# Patient Record
Sex: FEMALE | Race: WHITE | NOT HISPANIC OR LATINO | Employment: OTHER | URBAN - METROPOLITAN AREA
[De-identification: names, ages, dates, MRNs, and addresses within clinical notes are randomized per-mention and may not be internally consistent; named-entity substitution may affect disease eponyms.]

---

## 2018-11-05 ENCOUNTER — HOSPITAL ENCOUNTER (OUTPATIENT)
Facility: HOSPITAL | Age: 68
Discharge: HOME | DRG: 872 | End: 2018-11-07
Attending: EMERGENCY MEDICINE | Admitting: HOSPITALIST
Payer: MEDICARE

## 2018-11-05 DIAGNOSIS — N39.0 SEPSIS DUE TO URINARY TRACT INFECTION: Primary | ICD-10-CM

## 2018-11-05 DIAGNOSIS — A41.9 SEPSIS DUE TO URINARY TRACT INFECTION: Primary | ICD-10-CM

## 2018-11-05 DIAGNOSIS — R50.9 ACUTE FEBRILE ILLNESS: ICD-10-CM

## 2018-11-05 PROCEDURE — 83735 ASSAY OF MAGNESIUM: CPT

## 2018-11-05 PROCEDURE — G0378 HOSPITAL OBSERVATION PER HR: HCPCS

## 2018-11-05 PROCEDURE — 99285 EMERGENCY DEPT VISIT HI MDM: CPT | Mod: 25

## 2018-11-05 PROCEDURE — 82962 GLUCOSE BLOOD TEST: CPT

## 2018-11-05 PROCEDURE — 85025 COMPLETE CBC W/AUTO DIFF WBC: CPT

## 2018-11-05 PROCEDURE — 96374 THER/PROPH/DIAG INJ IV PUSH: CPT

## 2018-11-05 PROCEDURE — 83605 ASSAY OF LACTIC ACID: CPT

## 2018-11-05 PROCEDURE — 87040 BLOOD CULTURE FOR BACTERIA: CPT

## 2018-11-05 PROCEDURE — 84100 ASSAY OF PHOSPHORUS: CPT

## 2018-11-05 PROCEDURE — 81000 URINALYSIS NONAUTO W/SCOPE: CPT

## 2018-11-05 PROCEDURE — 11000001 HC ACUTE MED/SURG PRIVATE ROOM

## 2018-11-05 PROCEDURE — 87502 INFLUENZA DNA AMP PROBE: CPT

## 2018-11-05 PROCEDURE — 80053 COMPREHEN METABOLIC PANEL: CPT

## 2018-11-06 PROBLEM — I10 ESSENTIAL HYPERTENSION: Status: ACTIVE | Noted: 2018-11-06

## 2018-11-06 PROBLEM — N17.9 AKI (ACUTE KIDNEY INJURY): Status: ACTIVE | Noted: 2018-11-06

## 2018-11-06 PROBLEM — A41.9 SEPSIS DUE TO URINARY TRACT INFECTION: Status: ACTIVE | Noted: 2018-11-06

## 2018-11-06 PROBLEM — R65.20 SEVERE SEPSIS: Status: ACTIVE | Noted: 2018-11-06

## 2018-11-06 PROBLEM — N39.0 SEPSIS DUE TO URINARY TRACT INFECTION: Status: ACTIVE | Noted: 2018-11-06

## 2018-11-06 LAB
ALBUMIN SERPL BCP-MCNC: 2.7 G/DL
ALBUMIN SERPL BCP-MCNC: 3.6 G/DL
ALP SERPL-CCNC: 69 U/L
ALT SERPL W/O P-5'-P-CCNC: 20 U/L
ANION GAP SERPL CALC-SCNC: 12 MMOL/L
ANION GAP SERPL CALC-SCNC: 6 MMOL/L
AST SERPL-CCNC: 25 U/L
BACTERIA #/AREA URNS HPF: ABNORMAL /HPF
BASOPHILS # BLD AUTO: 0.01 K/UL
BASOPHILS # BLD AUTO: 0.02 K/UL
BASOPHILS NFR BLD: 0.2 %
BASOPHILS NFR BLD: 0.2 %
BILIRUB SERPL-MCNC: 0.4 MG/DL
BILIRUB UR QL STRIP: NEGATIVE
BUN SERPL-MCNC: 23 MG/DL
BUN SERPL-MCNC: 26 MG/DL
CALCIUM SERPL-MCNC: 10 MG/DL
CALCIUM SERPL-MCNC: 8.2 MG/DL
CHLORIDE SERPL-SCNC: 100 MMOL/L
CHLORIDE SERPL-SCNC: 106 MMOL/L
CLARITY UR: CLEAR
CO2 SERPL-SCNC: 23 MMOL/L
CO2 SERPL-SCNC: 24 MMOL/L
COLOR UR: YELLOW
CREAT SERPL-MCNC: 1.2 MG/DL
CREAT SERPL-MCNC: 1.6 MG/DL
DIFFERENTIAL METHOD: ABNORMAL
DIFFERENTIAL METHOD: NORMAL
EOSINOPHIL # BLD AUTO: 0 K/UL
EOSINOPHIL # BLD AUTO: 0.1 K/UL
EOSINOPHIL NFR BLD: 0.3 %
EOSINOPHIL NFR BLD: 0.6 %
ERYTHROCYTE [DISTWIDTH] IN BLOOD BY AUTOMATED COUNT: 13.4 %
ERYTHROCYTE [DISTWIDTH] IN BLOOD BY AUTOMATED COUNT: 13.4 %
EST. GFR  (AFRICAN AMERICAN): 38 ML/MIN/1.73 M^2
EST. GFR  (AFRICAN AMERICAN): 54 ML/MIN/1.73 M^2
EST. GFR  (NON AFRICAN AMERICAN): 33 ML/MIN/1.73 M^2
EST. GFR  (NON AFRICAN AMERICAN): 47 ML/MIN/1.73 M^2
ESTIMATED AVG GLUCOSE: 237 MG/DL
GLUCOSE SERPL-MCNC: 294 MG/DL
GLUCOSE SERPL-MCNC: 350 MG/DL
GLUCOSE UR QL STRIP: ABNORMAL
HBA1C MFR BLD HPLC: 9.9 %
HCT VFR BLD AUTO: 38.6 %
HCT VFR BLD AUTO: 45 %
HGB BLD-MCNC: 12.7 G/DL
HGB BLD-MCNC: 14.9 G/DL
HGB UR QL STRIP: ABNORMAL
HYALINE CASTS #/AREA URNS LPF: 1 /LPF
INFLUENZA A, MOLECULAR: NEGATIVE
INFLUENZA B, MOLECULAR: NEGATIVE
KETONES UR QL STRIP: NEGATIVE
LACTATE SERPL-SCNC: 1.3 MMOL/L
LACTATE SERPL-SCNC: 3 MMOL/L
LEUKOCYTE ESTERASE UR QL STRIP: ABNORMAL
LYMPHOCYTES # BLD AUTO: 1.7 K/UL
LYMPHOCYTES # BLD AUTO: 1.8 K/UL
LYMPHOCYTES NFR BLD: 19.7 %
LYMPHOCYTES NFR BLD: 27.4 %
MAGNESIUM SERPL-MCNC: 1.4 MG/DL
MAGNESIUM SERPL-MCNC: 1.8 MG/DL
MCH RBC QN AUTO: 29.5 PG
MCH RBC QN AUTO: 29.5 PG
MCHC RBC AUTO-ENTMCNC: 32.9 G/DL
MCHC RBC AUTO-ENTMCNC: 33.1 G/DL
MCV RBC AUTO: 89 FL
MCV RBC AUTO: 90 FL
MICROSCOPIC COMMENT: ABNORMAL
MONOCYTES # BLD AUTO: 0.4 K/UL
MONOCYTES # BLD AUTO: 1.1 K/UL
MONOCYTES NFR BLD: 13 %
MONOCYTES NFR BLD: 6.8 %
NEUTROPHILS # BLD AUTO: 4.2 K/UL
NEUTROPHILS # BLD AUTO: 5.9 K/UL
NEUTROPHILS NFR BLD: 65.3 %
NEUTROPHILS NFR BLD: 66.5 %
NITRITE UR QL STRIP: NEGATIVE
PH UR STRIP: 6 [PH] (ref 5–8)
PHOSPHATE SERPL-MCNC: 2.2 MG/DL
PHOSPHATE SERPL-MCNC: 2.2 MG/DL
PHOSPHATE SERPL-MCNC: 2.6 MG/DL
PLATELET # BLD AUTO: 165 K/UL
PLATELET # BLD AUTO: 207 K/UL
PMV BLD AUTO: 11 FL
PMV BLD AUTO: 11.9 FL
POCT GLUCOSE: 202 MG/DL (ref 70–110)
POCT GLUCOSE: 228 MG/DL (ref 70–110)
POCT GLUCOSE: 281 MG/DL (ref 70–110)
POCT GLUCOSE: 316 MG/DL (ref 70–110)
POCT GLUCOSE: 328 MG/DL (ref 70–110)
POTASSIUM SERPL-SCNC: 4 MMOL/L
POTASSIUM SERPL-SCNC: 4.2 MMOL/L
PROT SERPL-MCNC: 8.3 G/DL
PROT UR QL STRIP: ABNORMAL
RBC # BLD AUTO: 4.3 M/UL
RBC # BLD AUTO: 5.05 M/UL
RBC #/AREA URNS HPF: 1 /HPF (ref 0–4)
SODIUM SERPL-SCNC: 135 MMOL/L
SODIUM SERPL-SCNC: 136 MMOL/L
SP GR UR STRIP: 1.02 (ref 1–1.03)
SPECIMEN SOURCE: NORMAL
SQUAMOUS #/AREA URNS HPF: 2 /HPF
URN SPEC COLLECT METH UR: ABNORMAL
UROBILINOGEN UR STRIP-ACNC: NEGATIVE EU/DL
WBC # BLD AUTO: 6.46 K/UL
WBC # BLD AUTO: 8.79 K/UL
WBC #/AREA URNS HPF: 10 /HPF (ref 0–5)
YEAST URNS QL MICRO: ABNORMAL

## 2018-11-06 PROCEDURE — 87040 BLOOD CULTURE FOR BACTERIA: CPT

## 2018-11-06 PROCEDURE — 96372 THER/PROPH/DIAG INJ SC/IM: CPT

## 2018-11-06 PROCEDURE — 63600175 PHARM REV CODE 636 W HCPCS: Performed by: EMERGENCY MEDICINE

## 2018-11-06 PROCEDURE — 36415 COLL VENOUS BLD VENIPUNCTURE: CPT

## 2018-11-06 PROCEDURE — G0378 HOSPITAL OBSERVATION PER HR: HCPCS

## 2018-11-06 PROCEDURE — 25000003 PHARM REV CODE 250: Performed by: HOSPITALIST

## 2018-11-06 PROCEDURE — 94799 UNLISTED PULMONARY SVC/PX: CPT

## 2018-11-06 PROCEDURE — 80069 RENAL FUNCTION PANEL: CPT

## 2018-11-06 PROCEDURE — 25000003 PHARM REV CODE 250: Performed by: EMERGENCY MEDICINE

## 2018-11-06 PROCEDURE — 83036 HEMOGLOBIN GLYCOSYLATED A1C: CPT

## 2018-11-06 PROCEDURE — 83735 ASSAY OF MAGNESIUM: CPT

## 2018-11-06 PROCEDURE — 83605 ASSAY OF LACTIC ACID: CPT

## 2018-11-06 PROCEDURE — 63600175 PHARM REV CODE 636 W HCPCS: Performed by: HOSPITALIST

## 2018-11-06 PROCEDURE — 11000001 HC ACUTE MED/SURG PRIVATE ROOM

## 2018-11-06 PROCEDURE — S5571 INSULIN DISPOS PEN 3 ML: HCPCS | Performed by: EMERGENCY MEDICINE

## 2018-11-06 PROCEDURE — 85025 COMPLETE CBC W/AUTO DIFF WBC: CPT

## 2018-11-06 RX ORDER — IBUPROFEN 200 MG
16 TABLET ORAL
Status: DISCONTINUED | OUTPATIENT
Start: 2018-11-06 | End: 2018-11-07 | Stop reason: HOSPADM

## 2018-11-06 RX ORDER — ACETAMINOPHEN 325 MG/1
650 TABLET ORAL EVERY 6 HOURS PRN
Status: DISCONTINUED | OUTPATIENT
Start: 2018-11-06 | End: 2018-11-06

## 2018-11-06 RX ORDER — VALSARTAN AND HYDROCHLOROTHIAZIDE 320; 25 MG/1; MG/1
TABLET, FILM COATED ORAL
COMMUNITY
Start: 2018-10-27

## 2018-11-06 RX ORDER — GABAPENTIN 300 MG/1
300 CAPSULE ORAL 3 TIMES DAILY
COMMUNITY

## 2018-11-06 RX ORDER — GLUCAGON 1 MG
1 KIT INJECTION
Status: DISCONTINUED | OUTPATIENT
Start: 2018-11-06 | End: 2018-11-07 | Stop reason: HOSPADM

## 2018-11-06 RX ORDER — HYDRALAZINE HYDROCHLORIDE 20 MG/ML
10 INJECTION INTRAMUSCULAR; INTRAVENOUS EVERY 8 HOURS PRN
Status: DISCONTINUED | OUTPATIENT
Start: 2018-11-06 | End: 2018-11-07 | Stop reason: HOSPADM

## 2018-11-06 RX ORDER — LANOLIN ALCOHOL/MO/W.PET/CERES
400 CREAM (GRAM) TOPICAL 2 TIMES DAILY
Status: DISCONTINUED | OUTPATIENT
Start: 2018-11-06 | End: 2018-11-07 | Stop reason: HOSPADM

## 2018-11-06 RX ORDER — INSULIN ASPART 100 [IU]/ML
1-10 INJECTION, SOLUTION INTRAVENOUS; SUBCUTANEOUS
Status: DISCONTINUED | OUTPATIENT
Start: 2018-11-06 | End: 2018-11-07 | Stop reason: HOSPADM

## 2018-11-06 RX ORDER — HUMAN INSULIN 100 [IU]/ML
INJECTION, SOLUTION SUBCUTANEOUS
COMMUNITY
Start: 2018-08-24

## 2018-11-06 RX ORDER — IBUPROFEN 200 MG
24 TABLET ORAL
Status: DISCONTINUED | OUTPATIENT
Start: 2018-11-06 | End: 2018-11-07 | Stop reason: HOSPADM

## 2018-11-06 RX ORDER — VALSARTAN AND HYDROCHLOROTHIAZIDE 320; 25 MG/1; MG/1
1 TABLET, FILM COATED ORAL DAILY
Status: DISCONTINUED | OUTPATIENT
Start: 2018-11-06 | End: 2018-11-06

## 2018-11-06 RX ORDER — SODIUM CHLORIDE 0.9 % (FLUSH) 0.9 %
5 SYRINGE (ML) INJECTION
Status: DISCONTINUED | OUTPATIENT
Start: 2018-11-06 | End: 2018-11-07 | Stop reason: HOSPADM

## 2018-11-06 RX ORDER — GABAPENTIN 300 MG/1
300 CAPSULE ORAL 3 TIMES DAILY
Status: DISCONTINUED | OUTPATIENT
Start: 2018-11-06 | End: 2018-11-07 | Stop reason: HOSPADM

## 2018-11-06 RX ORDER — HEPARIN SODIUM 5000 [USP'U]/ML
5000 INJECTION, SOLUTION INTRAVENOUS; SUBCUTANEOUS EVERY 8 HOURS
Status: DISCONTINUED | OUTPATIENT
Start: 2018-11-06 | End: 2018-11-07 | Stop reason: HOSPADM

## 2018-11-06 RX ORDER — HYDROCHLOROTHIAZIDE 25 MG/1
25 TABLET ORAL DAILY
Status: DISCONTINUED | OUTPATIENT
Start: 2018-11-06 | End: 2018-11-06

## 2018-11-06 RX ORDER — SODIUM CHLORIDE 450 MG/100ML
INJECTION, SOLUTION INTRAVENOUS CONTINUOUS
Status: ACTIVE | OUTPATIENT
Start: 2018-11-06 | End: 2018-11-06

## 2018-11-06 RX ORDER — VALSARTAN 80 MG/1
320 TABLET ORAL DAILY
Status: DISCONTINUED | OUTPATIENT
Start: 2018-11-06 | End: 2018-11-07 | Stop reason: HOSPADM

## 2018-11-06 RX ORDER — SODIUM,POTASSIUM PHOSPHATES 280-250MG
2 POWDER IN PACKET (EA) ORAL
Status: COMPLETED | OUTPATIENT
Start: 2018-11-06 | End: 2018-11-07

## 2018-11-06 RX ORDER — ACETAMINOPHEN 10 MG/ML
1000 INJECTION, SOLUTION INTRAVENOUS EVERY 8 HOURS
Status: COMPLETED | OUTPATIENT
Start: 2018-11-06 | End: 2018-11-07

## 2018-11-06 RX ADMIN — INSULIN ASPART 3 UNITS: 100 INJECTION, SOLUTION INTRAVENOUS; SUBCUTANEOUS at 09:11

## 2018-11-06 RX ADMIN — INSULIN ASPART 4 UNITS: 100 INJECTION, SOLUTION INTRAVENOUS; SUBCUTANEOUS at 04:11

## 2018-11-06 RX ADMIN — GABAPENTIN 300 MG: 300 CAPSULE ORAL at 08:11

## 2018-11-06 RX ADMIN — CEFTRIAXONE 1 G: 1 INJECTION, SOLUTION INTRAVENOUS at 05:11

## 2018-11-06 RX ADMIN — POTASSIUM & SODIUM PHOSPHATES POWDER PACK 280-160-250 MG 2 PACKET: 280-160-250 PACK at 08:11

## 2018-11-06 RX ADMIN — HYDRALAZINE HYDROCHLORIDE 10 MG: 20 INJECTION INTRAMUSCULAR; INTRAVENOUS at 01:11

## 2018-11-06 RX ADMIN — ACETAMINOPHEN 650 MG: 325 TABLET ORAL at 05:11

## 2018-11-06 RX ADMIN — CEFTRIAXONE 2 G: 2 INJECTION, SOLUTION INTRAVENOUS at 01:11

## 2018-11-06 RX ADMIN — MAGNESIUM OXIDE TAB 400 MG (241.3 MG ELEMENTAL MG) 400 MG: 400 (241.3 MG) TAB at 05:11

## 2018-11-06 RX ADMIN — INSULIN ASPART 8 UNITS: 100 INJECTION, SOLUTION INTRAVENOUS; SUBCUTANEOUS at 11:11

## 2018-11-06 RX ADMIN — INSULIN DETEMIR 10 UNITS: 100 INJECTION, SOLUTION SUBCUTANEOUS at 08:11

## 2018-11-06 RX ADMIN — SODIUM CHLORIDE 2934 ML: 0.9 INJECTION, SOLUTION INTRAVENOUS at 12:11

## 2018-11-06 RX ADMIN — HEPARIN SODIUM 5000 UNITS: 5000 INJECTION, SOLUTION INTRAVENOUS; SUBCUTANEOUS at 06:11

## 2018-11-06 RX ADMIN — HEPARIN SODIUM 5000 UNITS: 5000 INJECTION, SOLUTION INTRAVENOUS; SUBCUTANEOUS at 09:11

## 2018-11-06 RX ADMIN — FOLIC ACID: 5 INJECTION, SOLUTION INTRAMUSCULAR; INTRAVENOUS; SUBCUTANEOUS at 05:11

## 2018-11-06 RX ADMIN — GABAPENTIN 300 MG: 300 CAPSULE ORAL at 02:11

## 2018-11-06 RX ADMIN — HYDRALAZINE HYDROCHLORIDE 10 MG: 20 INJECTION INTRAMUSCULAR; INTRAVENOUS at 05:11

## 2018-11-06 RX ADMIN — INSULIN ASPART 4 UNITS: 100 INJECTION, SOLUTION INTRAVENOUS; SUBCUTANEOUS at 06:11

## 2018-11-06 RX ADMIN — POTASSIUM & SODIUM PHOSPHATES POWDER PACK 280-160-250 MG 2 PACKET: 280-160-250 PACK at 05:11

## 2018-11-06 RX ADMIN — HEPARIN SODIUM 5000 UNITS: 5000 INJECTION, SOLUTION INTRAVENOUS; SUBCUTANEOUS at 01:11

## 2018-11-06 RX ADMIN — ACETAMINOPHEN 650 MG: 325 TABLET ORAL at 02:11

## 2018-11-06 RX ADMIN — ACETAMINOPHEN 1000 MG: 10 INJECTION, SOLUTION INTRAVENOUS at 05:11

## 2018-11-06 RX ADMIN — SODIUM CHLORIDE: 0.45 INJECTION, SOLUTION INTRAVENOUS at 03:11

## 2018-11-06 NOTE — HPI
68F h/o IDDM and HTN presents with fever x 3 days. Associated sxs include chills, generalized myalgia, increased urinary frequency, dysuria, and cloudy urine. Symptoms are constant and moderate in severity. Exacerbated by nothing and relieved by nothing. Patient denies any fatigue, diaphoresis, CP, SOB, flank pain,hematuria, abd pain, N/V/D, HA, weakness, and all other sxs at this time. Pt denies relief from ibuprofen. No further complaints or concerns at this time.  In ER, Tmax 101.5F, tachycardic and tachypneic, UA show many bacteria, 10 wbc, and trace leukocyte esterase. Lactate 3.0. Patient was given 30ml/kg of NS and pancultured.  Rocephin was started in ER.  Hospital medicine called for admission.

## 2018-11-06 NOTE — H&P
[facility] Hospital Medicine  History & Physical    Patient Name: Radha Martinez  MRN: [id number]  Admission Date: 11/5/2018  Attending Physician: Kofi Douglass MD  Primary Care Provider: Primary Doctor No         Patient information was obtained from patient and ER records.     Subjective:     Principal Problem:Sepsis due to urinary tract infection    Chief Complaint:   Chief Complaint   Patient presents with    Fever     fever, body aches, frequent urination, dysuria, cloudy urine        HPI: 68F h/o IDDM and HTN presents with fever x 3 days. Associated sxs include chills, generalized myalgia, increased urinary frequency, dysuria, and cloudy urine. Symptoms are constant and moderate in severity. Exacerbated by nothing and relieved by nothing. Patient denies any fatigue, diaphoresis, CP, SOB, flank pain,hematuria, abd pain, N/V/D, HA, weakness, and all other sxs at this time. Pt denies relief from ibuprofen. No further complaints or concerns at this time.  In ER, Tmax 101.5F, tachycardic and tachypneic, UA show many bacteria, 10 wbc, and trace leukocyte esterase. Lactate 3.0. Patient was given 30ml/kg of NS and pancultured.  Rocephin was started in ER.  Hospital medicine called for admission.        Past Medical History:   Diagnosis Date    Diabetes mellitus     Hypertension        History reviewed. No pertinent surgical history.    Review of patient's allergies indicates:  No Known Allergies    No current facility-administered medications on file prior to encounter.      Current Outpatient Medications on File Prior to Encounter   Medication Sig    gabapentin (NEURONTIN) 300 MG capsule Take 300 mg by mouth 3 (three) times daily.    insulin detemir (LEVEMIR FLEXPEN SUBQ) Inject into the skin.    liraglutide 0.6 mg/0.1 mL, 18 mg/3 mL, subq PNIJ (VICTOZA 2-SINDY) 0.6 mg/0.1 mL (18 mg/3 mL) PnIj Inject 0.6 mg into the skin once daily.    minocycline (MINOCIN,DYNACIN) 50 MG Cap Take 100 mg by mouth  every 12 (twelve) hours.    NOVOLIN R REGULAR U-100 INSULN 100 unit/mL injection     valsartan-hydrochlorothiazide (DIOVAN-HCT) 320-25 mg per tablet     [DISCONTINUED] insulin NPH-insulin regular, 70/30, (NOVOLIN 70/30) 100 unit/mL (70-30) injection Inject into the skin 2 (two) times daily.    [DISCONTINUED] clindamycin (CLEOCIN) 75 MG capsule Take 75 mg by mouth 4 (four) times daily.    [DISCONTINUED] hydrocodone-acetaminophen  (LORTAB)  mg per tablet Take 1 tablet by mouth every 6 (six) hours as needed.    [DISCONTINUED] oxycodone-acetaminophen 5-325 mg (PERCOCET) 5-325 mg per tablet Take 1 tablet by mouth every 4 (four) hours as needed for Pain.     Family History     Reviewed and not Pertinent           Tobacco Use    Smoking status: Never Smoker   Substance and Sexual Activity    Alcohol use: No    Drug use: Not on file    Sexual activity: Not on file     Review of Systems   Constitutional: Positive for chills, fatigue and fever. Negative for diaphoresis and unexpected weight change.   HENT: Negative for congestion, facial swelling, sore throat and trouble swallowing.    Eyes: Negative for photophobia, redness and visual disturbance.   Respiratory: Negative for apnea, cough, chest tightness, shortness of breath and wheezing.    Cardiovascular: Negative for chest pain, palpitations and leg swelling.   Gastrointestinal: Negative for abdominal distention, abdominal pain, blood in stool, constipation, diarrhea, nausea and vomiting.   Endocrine: Negative for polydipsia, polyphagia and polyuria.   Genitourinary: Positive for urgency. Negative for difficulty urinating, dysuria, flank pain, frequency and hematuria.   Musculoskeletal: Positive for myalgias. Negative for arthralgias, back pain, joint swelling and neck stiffness.   Skin: Negative for pallor and rash.   Allergic/Immunologic: Negative for immunocompromised state.   Neurological: Negative for dizziness, tremors, syncope, weakness,  light-headedness and headaches.   Psychiatric/Behavioral: Negative for agitation, confusion and suicidal ideas.   All other systems reviewed and are negative.    Objective:     Vital Signs (Most Recent):  Temp: (!) 101.5 °F (38.6 °C) (11/05/18 2305)  Pulse: (!) 112 (11/06/18 0031)  Resp: (!) 21 (11/06/18 0031)  BP: (!) 175/81 (11/06/18 0031)  SpO2: (!) 94 % (11/06/18 0031) Vital Signs (24h Range):  Temp:  [101.5 °F (38.6 °C)] 101.5 °F (38.6 °C)  Pulse:  [112-128] 112  Resp:  [21-26] 21  SpO2:  [94 %] 94 %  BP: (175-209)/() 175/81     Weight: 97.8 kg (215 lb 9.6 oz)  Body mass index is 35.88 kg/m².    Physical Exam   Constitutional: She is oriented to person, place, and time. She appears well-developed and well-nourished. No distress.   HENT:   Head: Normocephalic and atraumatic.   Mouth/Throat: Oropharynx is clear and moist.   Eyes: Conjunctivae and EOM are normal. Pupils are equal, round, and reactive to light. No scleral icterus.   Neck: Normal range of motion. Neck supple. No JVD present. No thyromegaly present.   Cardiovascular: Regular rhythm and intact distal pulses. Exam reveals no gallop and no friction rub.   No murmur heard.  tachycardia   Pulmonary/Chest: Effort normal and breath sounds normal. No respiratory distress. She has no wheezes. She has no rales. She exhibits no tenderness.   Abdominal: Soft. Bowel sounds are normal. She exhibits no distension and no mass. There is no tenderness. There is no guarding.   Musculoskeletal: Normal range of motion. She exhibits no tenderness.   Neurological: She is alert and oriented to person, place, and time. No cranial nerve deficit.   Skin: Skin is warm and dry. Capillary refill takes 2 to 3 seconds. No rash noted. She is not diaphoretic. No erythema.   Psychiatric: She has a normal mood and affect.   Nursing note and vitals reviewed.        CRANIAL NERVES     CN III, IV, VI   Pupils are equal, round, and reactive to light.  Extraocular motions are normal.         Significant Labs:   CBC:   Recent Labs   Lab 11/05/18  2352   WBC 8.79   HGB 14.9   HCT 45.0        CMP:   Recent Labs   Lab 11/05/18  2352   *   K 4.2      CO2 23   *   BUN 26*   CREATININE 1.6*   CALCIUM 10.0   PROT 8.3   ALBUMIN 3.6   BILITOT 0.4   ALKPHOS 69   AST 25   ALT 20   ANIONGAP 12   EGFRNONAA 33*     Lactic Acid:   Recent Labs   Lab 11/05/18  2352   LACTATE 3.0*     Urine Studies:   Recent Labs   Lab 11/05/18  2354   COLORU Yellow   APPEARANCEUA Clear   PHUR 6.0   SPECGRAV 1.025   PROTEINUA 1+*   GLUCUA 3+*   KETONESU Negative   BILIRUBINUA Negative   OCCULTUA Trace*   NITRITE Negative   UROBILINOGEN Negative   LEUKOCYTESUR Trace*   RBCUA 1   WBCUA 10*   BACTERIA Many*   SQUAMEPITHEL 2   HYALINECASTS 1     All pertinent labs within the past 24 hours have been reviewed.    Significant Imaging: I have reviewed all pertinent imaging results/findings within the past 24 hours.   Imaging Results          X-Ray Chest AP Portable (In process)                   Assessment/Plan:     * Sepsis due to urinary tract infection    - Severe sepsis due to UTI  - Tachycardic, tachypneic, tmax 101.5F  - initial lactate 3.0  - was given 30ml/kg fluid bolus    - continue 1/2 NS at 125cc/hr  - continue rocephin IV  - follow up cx  - trend lactate until <2.0         Essential hypertension    - hold home meds  - hydralazine IV prn SBP>180       Insulin dependent diabetes mellitus    - Hold home meds  - start diabetic diet, poc glucose qac/qhs, ssi prn         SOULEYMANE (acute kidney injury)    - prerenal due to hyperglycemia, sepsis, IVVD  - hold ACEi  - continue IVF  - monitor I/O         VTE Risk Mitigation (From admission, onward)        Ordered     heparin (porcine) injection 5,000 Units  Every 8 hours      11/06/18 0142     IP VTE HIGH RISK PATIENT  Once      11/06/18 0142             Kofi Douglass MD  Department of Hospital Medicine   [facility]

## 2018-11-06 NOTE — ED PROVIDER NOTES
SCRIBE #1 NOTE: I, Sha Rod, am scribing for, and in the presence of, Saul Siddiqui MD. I have scribed the entire note.      History     Chief Complaint   Patient presents with    Fever     fever, body aches, frequent urination, dysuria, cloudy urine     Review of patient's allergies indicates:  No Known Allergies      History of Present Illness     HPI    11/5/2018, 11:11 PM  History obtained from the patient      History of Present Illness: Radha Martinez is a 68 y.o. female patient with a PMHx including DM and HTN who presents to the Emergency Department for evaluation of a fever (101.5) which onset gradually a few days ago. Symptoms are constant and moderate in severity. Exacerbated by nothing and relieved by nothing. Associated sxs include chills, generalized myalgia, urinary frequency, dysuria, and cloudy urine. Patient denies any fatigue, diaphoresis, CP, SOB, flank pain,hematuria, abd pain, N/V/D, HA, weakness, and all other sxs at this time. Pt denies relief from ibuprofen. No further complaints or concerns at this time.       Arrival mode: Personal vehicle    PCP: Primary Doctor No        Past Medical History:  Past Medical History:   Diagnosis Date    Diabetes mellitus     Hypertension        Past Surgical History:  Past surgical history reviewed not relevant      Family History:  Family history reviewed not relevant      Social History:  Social History     Tobacco Use    Smoking status: Never Smoker   Substance and Sexual Activity    Alcohol use: No    Drug use: Unknown    Sexual activity: Unknown        Review of Systems     Review of Systems   Constitutional: Positive for chills and fever. Negative for activity change, diaphoresis and fatigue.   HENT: Negative for congestion, sore throat and trouble swallowing.    Eyes: Negative for photophobia and visual disturbance.   Respiratory: Negative for cough and shortness of breath.    Cardiovascular: Negative for chest pain.   Gastrointestinal:  Negative for abdominal pain, nausea and vomiting.   Genitourinary: Positive for dysuria and frequency. Negative for decreased urine volume, difficulty urinating, flank pain, hematuria, pelvic pain, urgency, vaginal bleeding, vaginal discharge and vaginal pain.        (+) Cloudy urine   Musculoskeletal: Positive for myalgias (Generalized). Negative for arthralgias, back pain, gait problem, joint swelling, neck pain and neck stiffness.   Skin: Negative for rash.   Neurological: Negative for dizziness, syncope, weakness, light-headedness, numbness and headaches.   Psychiatric/Behavioral: Negative for confusion.   All other systems reviewed and are negative.     Physical Exam     Initial Vitals [11/05/18 2305]   BP Pulse Resp Temp SpO2   (!) 209/101 (!) 128 (!) 26 (!) 101.5 °F (38.6 °C) (!) 94 %      MAP       --          Physical Exam  Nursing Notes and Vital Signs Reviewed.  Constitutional: Patient is in no acute distress. Well-developed and well-nourished.  Head: Atraumatic. Normocephalic.  Eyes: PERRL. EOM intact. Conjunctivae are not pale. No scleral icterus.  ENT: Mucous membranes are moist. Oropharynx is clear and symmetric.    Neck: Supple. Full ROM. No lymphadenopathy.  Cardiovascular: Regular rate. Regular rhythm. No murmurs, rubs, or gallops. Distal pulses are 2+ and symmetric.  Pulmonary/Chest: No respiratory distress. Clear to auscultation bilaterally. No wheezing or rales.  Abdominal: Soft and non-distended.  There is no tenderness.  No rebound, guarding, or rigidity. Good bowel sounds.  Genitourinary: No CVA tenderness  Musculoskeletal: Moves all extremities. No obvious deformities. No edema. No calf tenderness.  Skin: Warm and dry.  Neurological:  Alert, awake, and appropriate.  Normal speech.  No acute focal neurological deficits are appreciated.  Psychiatric: Normal affect. Good eye contact. Appropriate in content.     ED Course   Procedures  ED Vital Signs:  Vitals:    11/05/18 2305 11/05/18 2325  "11/05/18 2348 11/06/18 0000   BP: (!) 209/101      Pulse: (!) 128 (!) 116  (!) 119   Resp: (!) 26      Temp: (!) 101.5 °F (38.6 °C)      TempSrc: Oral      SpO2: (!) 94%      Weight:   97.8 kg (215 lb 9.6 oz)    Height: 5' 5" (1.651 m)       11/06/18 0001 11/06/18 0031   BP: (!) 187/87 (!) 175/81   Pulse: (!) 120 (!) 112   Resp: (!) 25 (!) 21   Temp:     TempSrc:     SpO2: (!) 94% (!) 94%   Weight:     Height:         Abnormal Lab Results:  Labs Reviewed   CBC W/ AUTO DIFFERENTIAL - Abnormal; Notable for the following components:       Result Value    Mono # 1.1 (*)     All other components within normal limits   COMPREHENSIVE METABOLIC PANEL - Abnormal; Notable for the following components:    Sodium 135 (*)     Glucose 350 (*)     BUN, Bld 26 (*)     Creatinine 1.6 (*)     eGFR if  38 (*)     eGFR if non  33 (*)     All other components within normal limits   LACTIC ACID, PLASMA - Abnormal; Notable for the following components:    Lactate (Lactic Acid) 3.0 (*)     All other components within normal limits   URINALYSIS, REFLEX TO URINE CULTURE - Abnormal; Notable for the following components:    Protein, UA 1+ (*)     Glucose, UA 3+ (*)     Occult Blood UA Trace (*)     Leukocytes, UA Trace (*)     All other components within normal limits    Narrative:     Preferred Collection Type->Urine, Clean Catch   URINALYSIS MICROSCOPIC - Abnormal; Notable for the following components:    WBC, UA 10 (*)     Bacteria, UA Many (*)     All other components within normal limits    Narrative:     Preferred Collection Type->Urine, Clean Catch   PHOSPHORUS - Abnormal; Notable for the following components:    Phosphorus 2.6 (*)     All other components within normal limits   INFLUENZA A & B BY MOLECULAR    Narrative:     Preferred Collection Type->Urine, Clean Catch   CULTURE, BLOOD   CULTURE, BLOOD   MAGNESIUM   PHOSPHORUS   MAGNESIUM   URINALYSIS, REFLEX TO URINE CULTURE   LACTIC ACID, PLASMA "   HEMOGLOBIN A1C   CBC W/ AUTO DIFFERENTIAL   RENAL FUNCTION PANEL   PHOSPHORUS   MAGNESIUM   POCT GLUCOSE MONITORING CONTINUOUS        All Lab Results:  Results for orders placed or performed during the hospital encounter of 11/05/18   Influenza A & B by Molecular   Result Value Ref Range    Influenza A, Molecular Negative Negative    Influenza B, Molecular Negative Negative    Flu A & B Source Nasal swab    CBC auto differential   Result Value Ref Range    WBC 8.79 3.90 - 12.70 K/uL    RBC 5.05 4.00 - 5.40 M/uL    Hemoglobin 14.9 12.0 - 16.0 g/dL    Hematocrit 45.0 37.0 - 48.5 %    MCV 89 82 - 98 fL    MCH 29.5 27.0 - 31.0 pg    MCHC 33.1 32.0 - 36.0 g/dL    RDW 13.4 11.5 - 14.5 %    Platelets 207 150 - 350 K/uL    MPV 11.9 9.2 - 12.9 fL    Gran # (ANC) 5.9 1.8 - 7.7 K/uL    Lymph # 1.7 1.0 - 4.8 K/uL    Mono # 1.1 (H) 0.3 - 1.0 K/uL    Eos # 0.1 0.0 - 0.5 K/uL    Baso # 0.02 0.00 - 0.20 K/uL    Gran% 66.5 38.0 - 73.0 %    Lymph% 19.7 18.0 - 48.0 %    Mono% 13.0 4.0 - 15.0 %    Eosinophil% 0.6 0.0 - 8.0 %    Basophil% 0.2 0.0 - 1.9 %    Differential Method Automated    Comprehensive metabolic panel   Result Value Ref Range    Sodium 135 (L) 136 - 145 mmol/L    Potassium 4.2 3.5 - 5.1 mmol/L    Chloride 100 95 - 110 mmol/L    CO2 23 23 - 29 mmol/L    Glucose 350 (H) 70 - 110 mg/dL    BUN, Bld 26 (H) 8 - 23 mg/dL    Creatinine 1.6 (H) 0.5 - 1.4 mg/dL    Calcium 10.0 8.7 - 10.5 mg/dL    Total Protein 8.3 6.0 - 8.4 g/dL    Albumin 3.6 3.5 - 5.2 g/dL    Total Bilirubin 0.4 0.1 - 1.0 mg/dL    Alkaline Phosphatase 69 55 - 135 U/L    AST 25 10 - 40 U/L    ALT 20 10 - 44 U/L    Anion Gap 12 8 - 16 mmol/L    eGFR if African American 38 (A) >60 mL/min/1.73 m^2    eGFR if non African American 33 (A) >60 mL/min/1.73 m^2   Lactic acid, plasma #1   Result Value Ref Range    Lactate (Lactic Acid) 3.0 (H) 0.5 - 2.2 mmol/L   Urinalysis, Reflex to Urine Culture Urine, Clean Catch   Result Value Ref Range    Specimen UA Urine, Clean  Catch     Color, UA Yellow Yellow, Straw, Robyn    Appearance, UA Clear Clear    pH, UA 6.0 5.0 - 8.0    Specific Gravity, UA 1.025 1.005 - 1.030    Protein, UA 1+ (A) Negative    Glucose, UA 3+ (A) Negative    Ketones, UA Negative Negative    Bilirubin (UA) Negative Negative    Occult Blood UA Trace (A) Negative    Nitrite, UA Negative Negative    Urobilinogen, UA Negative <2.0 EU/dL    Leukocytes, UA Trace (A) Negative   Urinalysis Microscopic   Result Value Ref Range    RBC, UA 1 0 - 4 /hpf    WBC, UA 10 (H) 0 - 5 /hpf    Bacteria, UA Many (A) None-Occ /hpf    Yeast, UA None None    Squam Epithel, UA 2 /hpf    Hyaline Casts, UA 1 0-1/lpf /lpf    Microscopic Comment SEE COMMENT    Magnesium   Result Value Ref Range    Magnesium 1.8 1.6 - 2.6 mg/dL   Phosphorus   Result Value Ref Range    Phosphorus 2.6 (L) 2.7 - 4.5 mg/dL         Imaging Results:    Interpreted by virtual imaging.  1:23  Study: X-Ray Chest AP Portable  Findings: Per virtual imaging, NAF             The Emergency Provider reviewed the vital signs and test results, which are outlined above.     ED Discussion     1:32 AM: Discussed case with Dr. Douglass ([facility]). Dr. Douglass agrees with current care and management of pt and accepts admission.   Admitting Service: [facility]   Admitting Physician: Dr. Douglass  Admit to: Med/surg    1:32 AM: Re-evaluated pt. I have discussed test results, shared treatment plan, and the need for admission with patient and family at bedside. Pt and family express understanding at this time and agree with all information. All questions answered. Pt and family have no further questions or concerns at this time. Pt is ready for admit.      ED Medication(s):  Medications   cefTRIAXone (ROCEPHIN) 2 g in dextrose 5 % 50 mL IVPB (2 g Intravenous New Bag 11/6/18 0138)   sodium chloride 0.9% flush 5 mL (not administered)   glucose chewable tablet 16 g (not administered)   glucose chewable tablet 24 g (not administered)    dextrose 50% injection 12.5 g (not administered)   dextrose 50% injection 25 g (not administered)   glucagon (human recombinant) injection 1 mg (not administered)   heparin (porcine) injection 5,000 Units (not administered)   0.45% NaCl infusion (not administered)   gabapentin capsule 300 mg (not administered)   insulin aspart U-100 pen 1-10 Units (not administered)   hydrALAZINE injection 10 mg (not administered)   acetaminophen tablet 650 mg (not administered)   sodium chloride 0.9% bolus 2,934 mL (2,934 mLs Intravenous New Bag 11/6/18 0009)        Medical Decision Making     Medical Decision Making:   Clinical Tests:   Lab Tests: Ordered and Reviewed  Radiological Study: Ordered and Reviewed             Scribe Attestation:   Scribe #1: I performed the above scribed service and the documentation accurately describes the services I performed. I attest to the accuracy of the note. 11/05/2018 11:11 PM    Attending:   Physician Attestation Statement for Scribe #1: I, Saul Siddiqui MD, personally performed the services described in this documentation, as scribed by Sha Velasco, in my presence, and it is both accurate and complete.           Clinical Impression       ICD-10-CM ICD-9-CM   1. Sepsis due to urinary tract infection A41.9 038.9    N39.0 995.91     599.0   2. Acute febrile illness R50.9 780.60       Disposition:   Disposition: Admitted  Condition: Stable             Saul Siddiqui MD  11/06/18 0211

## 2018-11-06 NOTE — PLAN OF CARE
Met with patient's daughter, Shanda Mckeon [phone], at bedside as patient was sleeping to complete discharge planning assessment. Daughter confirmed patients demographics and updated emergency contact list. Daughter reports that patient lives in [place] with her  who works offshore and that when he is offshore for weeks at a time, patient stays with daughter here is [place]. Daughter reports that they have great family support here in [place]. Daughter reports they are not anticipating any discharge needs at this time and that patient is scheduled for cataract surgery back in [place] 11/19/18. MSW provided patient with Transitional care folder with information on Advance Directives, [facility] bedside delivery program, and the discharge planning checklist pamphlet. MSW updated patient whiteboard with current discharge plan and contact information. Encouraged daughter to contact MSW with any questions or concerns that arise. MSW to continue to follow for possible d/c needs.         11/06/18 1524   Discharge Assessment   Assessment Type Discharge Planning Assessment   Confirmed/corrected address and phone number on facesheet? Yes   Assessment information obtained from? Caregiver  (Daughter)   Communicated expected length of stay with patient/caregiver yes   Prior to hospitilization cognitive status: Alert/Oriented   Prior to hospitalization functional status: Independent   Current cognitive status: Alert/Oriented   Current Functional Status: Independent   Lives With spouse   Able to Return to Prior Arrangements yes   Is patient able to care for self after discharge? Yes   Who are your caregiver(s) and their phone number(s)? Bib Martinez,  [phone]; Shanda Mckeon, daughter [phone]; Deandre Martinez, son [phone]   Readmission Within The Last 30 Days no previous admission in last 30 days   Patient currently being followed by outpatient case management? No    Patient currently receives any other outside agency services? No   Equipment Currently Used at Home glucometer   Do you have any problems affording any of your prescribed medications? No   Does the patient have transportation home? Yes   Transportation Available car;family or friend will provide   Does the patient receive services at the [facility]? No   Discharge Plan A Home;Home with family   Patient/Family In Agreement With Plan yes

## 2018-11-06 NOTE — SUBJECTIVE & OBJECTIVE
Past Medical History:   Diagnosis Date    Diabetes mellitus     Hypertension        History reviewed. No pertinent surgical history.    Review of patient's allergies indicates:  No Known Allergies    No current facility-administered medications on file prior to encounter.      Current Outpatient Medications on File Prior to Encounter   Medication Sig    gabapentin (NEURONTIN) 300 MG capsule Take 300 mg by mouth 3 (three) times daily.    insulin detemir (LEVEMIR FLEXPEN SUBQ) Inject into the skin.    liraglutide 0.6 mg/0.1 mL, 18 mg/3 mL, subq PNIJ (VICTOZA 2-SINDY) 0.6 mg/0.1 mL (18 mg/3 mL) PnIj Inject 0.6 mg into the skin once daily.    minocycline (MINOCIN,DYNACIN) 50 MG Cap Take 100 mg by mouth every 12 (twelve) hours.    NOVOLIN R REGULAR U-100 INSULN 100 unit/mL injection     valsartan-hydrochlorothiazide (DIOVAN-HCT) 320-25 mg per tablet     [DISCONTINUED] insulin NPH-insulin regular, 70/30, (NOVOLIN 70/30) 100 unit/mL (70-30) injection Inject into the skin 2 (two) times daily.    [DISCONTINUED] clindamycin (CLEOCIN) 75 MG capsule Take 75 mg by mouth 4 (four) times daily.    [DISCONTINUED] hydrocodone-acetaminophen  (LORTAB)  mg per tablet Take 1 tablet by mouth every 6 (six) hours as needed.    [DISCONTINUED] oxycodone-acetaminophen 5-325 mg (PERCOCET) 5-325 mg per tablet Take 1 tablet by mouth every 4 (four) hours as needed for Pain.     Family History     None        Tobacco Use    Smoking status: Never Smoker   Substance and Sexual Activity    Alcohol use: No    Drug use: Not on file    Sexual activity: Not on file     Review of Systems   Constitutional: Positive for chills, fatigue and fever. Negative for diaphoresis and unexpected weight change.   HENT: Negative for congestion, facial swelling, sore throat and trouble swallowing.    Eyes: Negative for photophobia, redness and visual disturbance.   Respiratory: Negative for apnea, cough, chest tightness, shortness of breath and  wheezing.    Cardiovascular: Negative for chest pain, palpitations and leg swelling.   Gastrointestinal: Negative for abdominal distention, abdominal pain, blood in stool, constipation, diarrhea, nausea and vomiting.   Endocrine: Negative for polydipsia, polyphagia and polyuria.   Genitourinary: Positive for urgency. Negative for difficulty urinating, dysuria, flank pain, frequency and hematuria.   Musculoskeletal: Positive for myalgias. Negative for arthralgias, back pain, joint swelling and neck stiffness.   Skin: Negative for pallor and rash.   Allergic/Immunologic: Negative for immunocompromised state.   Neurological: Negative for dizziness, tremors, syncope, weakness, light-headedness and headaches.   Psychiatric/Behavioral: Negative for agitation, confusion and suicidal ideas.   All other systems reviewed and are negative.    Objective:     Vital Signs (Most Recent):  Temp: (!) 101.5 °F (38.6 °C) (11/05/18 2305)  Pulse: (!) 112 (11/06/18 0031)  Resp: (!) 21 (11/06/18 0031)  BP: (!) 175/81 (11/06/18 0031)  SpO2: (!) 94 % (11/06/18 0031) Vital Signs (24h Range):  Temp:  [101.5 °F (38.6 °C)] 101.5 °F (38.6 °C)  Pulse:  [112-128] 112  Resp:  [21-26] 21  SpO2:  [94 %] 94 %  BP: (175-209)/() 175/81     Weight: 97.8 kg (215 lb 9.6 oz)  Body mass index is 35.88 kg/m².    Physical Exam   Constitutional: She is oriented to person, place, and time. She appears well-developed and well-nourished. No distress.   HENT:   Head: Normocephalic and atraumatic.   Mouth/Throat: Oropharynx is clear and moist.   Eyes: Conjunctivae and EOM are normal. Pupils are equal, round, and reactive to light. No scleral icterus.   Neck: Normal range of motion. Neck supple. No JVD present. No thyromegaly present.   Cardiovascular: Regular rhythm and intact distal pulses. Exam reveals no gallop and no friction rub.   No murmur heard.  tachycardia   Pulmonary/Chest: Effort normal and breath sounds normal. No respiratory distress. She has no  wheezes. She has no rales. She exhibits no tenderness.   Abdominal: Soft. Bowel sounds are normal. She exhibits no distension and no mass. There is no tenderness. There is no guarding.   Musculoskeletal: Normal range of motion. She exhibits no tenderness.   Neurological: She is alert and oriented to person, place, and time. No cranial nerve deficit.   Skin: Skin is warm and dry. Capillary refill takes 2 to 3 seconds. No rash noted. She is not diaphoretic. No erythema.   Psychiatric: She has a normal mood and affect.   Nursing note and vitals reviewed.        CRANIAL NERVES     CN III, IV, VI   Pupils are equal, round, and reactive to light.  Extraocular motions are normal.        Significant Labs:   CBC:   Recent Labs   Lab 11/05/18  2352   WBC 8.79   HGB 14.9   HCT 45.0        CMP:   Recent Labs   Lab 11/05/18  2352   *   K 4.2      CO2 23   *   BUN 26*   CREATININE 1.6*   CALCIUM 10.0   PROT 8.3   ALBUMIN 3.6   BILITOT 0.4   ALKPHOS 69   AST 25   ALT 20   ANIONGAP 12   EGFRNONAA 33*     Lactic Acid:   Recent Labs   Lab 11/05/18  2352   LACTATE 3.0*     Urine Studies:   Recent Labs   Lab 11/05/18  2354   COLORU Yellow   APPEARANCEUA Clear   PHUR 6.0   SPECGRAV 1.025   PROTEINUA 1+*   GLUCUA 3+*   KETONESU Negative   BILIRUBINUA Negative   OCCULTUA Trace*   NITRITE Negative   UROBILINOGEN Negative   LEUKOCYTESUR Trace*   RBCUA 1   WBCUA 10*   BACTERIA Many*   SQUAMEPITHEL 2   HYALINECASTS 1     All pertinent labs within the past 24 hours have been reviewed.    Significant Imaging: I have reviewed all pertinent imaging results/findings within the past 24 hours.   Imaging Results          X-Ray Chest AP Portable (In process)

## 2018-11-06 NOTE — PROGRESS NOTES
[facility] Hospital Medicine  Progress Note    Patient Name: Radha Martinez  MRN: [id number]  Patient Class: IP- Inpatient   Admission Date: 11/5/2018  Length of Stay: 0 days  Attending Physician: Don Ha MD  Primary Care Provider: Primary Doctor No        Subjective:     Principal Problem:Sepsis due to urinary tract infection    HPI:  68F h/o IDDM and HTN presents with fever x 3 days. Associated sxs include chills, generalized myalgia, increased urinary frequency, dysuria, and cloudy urine. Symptoms are constant and moderate in severity. Exacerbated by nothing and relieved by nothing. Patient denies any fatigue, diaphoresis, CP, SOB, flank pain,hematuria, abd pain, N/V/D, HA, weakness, and all other sxs at this time. Pt denies relief from ibuprofen. No further complaints or concerns at this time.  In ER, Tmax 101.5F, tachycardic and tachypneic, UA show many bacteria, 10 wbc, and trace leukocyte esterase. Lactate 3.0. Patient was given 30ml/kg of NS and pancultured.  Rocephin was started in ER.  Hospital medicine called for admission.        Hospital Course:  Feels better but c/o feeling chills, no fever or sweating at present, no urinary Sx. Urine Cx still NGTD, HgA1c 9.9. Mg and PO4 a little low-- replaced orally. Pt given IV tylenol and started on banana bag. Pt encouraged to ambulate.     Interval History: Feels better but c/o feeling chills, no fever or sweating at present, no urinary Sx. Urine Cx still NGTD, HgA1c 9.9. Mg and PO4 a little low-- replaced orally. Pt given IV tylenol and started on banana bag. Pt encouraged to ambulate.    Review of Systems   Constitutional: Positive for chills, fatigue and fever. Negative for diaphoresis and unexpected weight change.   HENT: Negative for congestion, facial swelling, sore throat and trouble swallowing.    Eyes: Negative for photophobia, redness and visual disturbance.   Respiratory: Negative for apnea, cough, chest tightness, shortness of  breath and wheezing.    Cardiovascular: Negative for chest pain, palpitations and leg swelling.   Gastrointestinal: Negative for abdominal distention, abdominal pain, blood in stool, constipation, diarrhea, nausea and vomiting.   Endocrine: Negative for polydipsia, polyphagia and polyuria.   Genitourinary: Positive for urgency. Negative for difficulty urinating, dysuria, flank pain, frequency and hematuria.   Musculoskeletal: Positive for myalgias. Negative for arthralgias, back pain, joint swelling and neck stiffness.   Skin: Negative for pallor and rash.   Allergic/Immunologic: Negative for immunocompromised state.   Neurological: Negative for dizziness, tremors, syncope, weakness, light-headedness and headaches.   Psychiatric/Behavioral: Negative for agitation, confusion and suicidal ideas.   All other systems reviewed and are negative.    Objective:     Vital Signs (Most Recent):  Temp: 98.7 °F (37.1 °C) (11/06/18 1636)  Pulse: 93 (11/06/18 1636)  Resp: 18 (11/06/18 1636)  BP: (!) 165/76 (11/06/18 1636)  SpO2: 97 % (11/06/18 1636) Vital Signs (24h Range):  Temp:  [97.7 °F (36.5 °C)-101.5 °F (38.6 °C)] 98.7 °F (37.1 °C)  Pulse:  [] 93  Resp:  [14-26] 18  SpO2:  [94 %-97 %] 97 %  BP: (131-209)/() 165/76     Weight: 97.8 kg (215 lb 9.6 oz)  Body mass index is 35.88 kg/m².    Intake/Output Summary (Last 24 hours) at 11/6/2018 1721  Last data filed at 11/6/2018 0500  Gross per 24 hour   Intake 150 ml   Output --   Net 150 ml      Physical Exam   Constitutional: She is oriented to person, place, and time. She appears well-developed and well-nourished. No distress.   Elderly lady, severely obese, AAOx3, looks tired but NAD   HENT:   Head: Normocephalic and atraumatic.   Mouth/Throat: Oropharynx is clear and moist.   Eyes: Conjunctivae and EOM are normal. Pupils are equal, round, and reactive to light. No scleral icterus.   Neck: Normal range of motion. Neck supple. No JVD present. No thyromegaly present.    Cardiovascular: Regular rhythm and intact distal pulses. Exam reveals no gallop and no friction rub.   No murmur heard.  tachycardia   Pulmonary/Chest: Effort normal and breath sounds normal. No respiratory distress. She has no wheezes. She has no rales. She exhibits no tenderness.   Abdominal: Soft. Bowel sounds are normal. She exhibits no distension and no mass. There is no tenderness. There is no guarding.   Musculoskeletal: Normal range of motion. She exhibits no tenderness.   Neurological: She is alert and oriented to person, place, and time. No cranial nerve deficit.   Skin: Skin is warm and dry. Capillary refill takes 2 to 3 seconds. No rash noted. She is not diaphoretic. No erythema.   Psychiatric: She has a normal mood and affect.   Nursing note and vitals reviewed.      Significant Labs:   A1C:   Recent Labs   Lab 11/06/18 0327   HGBA1C 9.9*     ABGs:   Blood Culture:   Recent Labs   Lab 11/05/18 2352 11/06/18  0030   LABBLOO No Growth to date No Growth to date     BMP:   Recent Labs   Lab 11/06/18 0327   *      K 4.0      CO2 24   BUN 23   CREATININE 1.2   CALCIUM 8.2*   MG 1.4*     CBC:   Recent Labs   Lab 11/05/18 2352 11/06/18 0327   WBC 8.79 6.46   HGB 14.9 12.7   HCT 45.0 38.6    165     CMP:   Recent Labs   Lab 11/05/18 2352 11/06/18 0327   * 136   K 4.2 4.0    106   CO2 23 24   * 294*   BUN 26* 23   CREATININE 1.6* 1.2   CALCIUM 10.0 8.2*   PROT 8.3  --    ALBUMIN 3.6 2.7*   BILITOT 0.4  --    ALKPHOS 69  --    AST 25  --    ALT 20  --    ANIONGAP 12 6*   EGFRNONAA 33* 47*     Cardiac Markers:   Coagulation:   Lactic Acid:   Recent Labs   Lab 11/05/18 2352 11/06/18 0327   LACTATE 3.0* 1.3     Lipase:   Magnesium:   Recent Labs   Lab 11/05/18 2352 11/06/18 0327   MG 1.8 1.4*     Respiratory Culture:   Troponin:   TSH:   Urine Culture:   All pertinent labs within the past 24 hours have been reviewed.    Significant Imaging: I have reviewed all  pertinent imaging results/findings within the past 24 hours.    Assessment/Plan:      * Sepsis due to urinary tract infection    - Severe sepsis due to UTI  - Tachycardic, tachypneic, tmax 101.5F  - initial lactate 3.0  - was given 30ml/kg fluid bolus    - continue 1/2 NS at 125cc/hr  - continue rocephin IV  - follow up cx  - trend lactate until <2.0    Improved, will continue with IVF- banana bag  Check Lactate in am     Essential hypertension    - hold home meds  - hydralazine IV prn SBP>180       Insulin dependent diabetes mellitus- Uncontrolled    - Hold home meds  - start diabetic diet, poc glucose qac/qhs, ssi prn    Need to increase home insulin dose a little      SOULEYMANE (acute kidney injury)    - prerenal due to hyperglycemia, sepsis, IVVD  - hold ACEi  - continue IVF  - monitor I/O    Hold HCTZ too  Continue IVF         VTE Risk Mitigation (From admission, onward)        Ordered     heparin (porcine) injection 5,000 Units  Every 8 hours      11/06/18 0142     IP VTE HIGH RISK PATIENT  Once      11/06/18 0142              Don Ha MD  Department of Hospital Medicine   [facility]

## 2018-11-06 NOTE — PLAN OF CARE
11/06/18 1504   Medicare Message   Important Message from Medicare regarding Discharge Appeal Rights Given to patient/caregiver;Explained to patient/caregiver;Signed/date by patient/caregiver   Date IMM was signed 11/06/18   Time IMM was signed 1977

## 2018-11-06 NOTE — NURSING
Patient assessed for diabetes educational needs following chart review  Family at bedside for info  She reports was diagnosed over 14 years ago and has received diabetes education and literature from her PCP  She recently relocated from [place] to [place].  She is here visiting family.  She is re-established with a PCP in [place] who is in the process of adjusting and changing her Diabetes med's    Her plan to to return to [place].  She has a home glucose monitor and checks 1-2 times weekly.  She is prescribed Levemir, Novolog (3 times daily) and Victoza.  Reviewed med's with onset, duration and method of action.  She reports is consistent with administering her med's  Review on target glucose values, hyper/hypoglycemia.  She verbalizes understanding.   Does not need literature

## 2018-11-06 NOTE — ASSESSMENT & PLAN NOTE
- Severe sepsis due to UTI  - Tachycardic, tachypneic, tmax 101.5F  - initial lactate 3.0  - was given 30ml/kg fluid bolus    - continue 1/2 NS at 125cc/hr  - continue rocephin IV  - follow up cx  - trend lactate until <2.0    Improved, will continue with IVF- banana bag  Check Lactate in am

## 2018-11-06 NOTE — PLAN OF CARE
Problem: Patient Care Overview  Goal: Plan of Care Review  Outcome: Ongoing (interventions implemented as appropriate)  Plan of care reviewed with patient; verbalized understanding. Fall precautions maintained, patient remains free from injury. Medications being administered as ordered. Vital signs stable with no signs of distress noted. Bed low and locked with call light in reach. Will continue monitor.

## 2018-11-06 NOTE — SUBJECTIVE & OBJECTIVE
Interval History: Feels better but c/o feeling chills, no fever or sweating at present, no urinary Sx. Urine Cx still NGTD, HgA1c 9.9. Mg and PO4 a little low-- replaced orally. Pt given IV tylenol and started on banana bag. Pt encouraged to ambulate.    Review of Systems   Constitutional: Positive for chills, fatigue and fever. Negative for diaphoresis and unexpected weight change.   HENT: Negative for congestion, facial swelling, sore throat and trouble swallowing.    Eyes: Negative for photophobia, redness and visual disturbance.   Respiratory: Negative for apnea, cough, chest tightness, shortness of breath and wheezing.    Cardiovascular: Negative for chest pain, palpitations and leg swelling.   Gastrointestinal: Negative for abdominal distention, abdominal pain, blood in stool, constipation, diarrhea, nausea and vomiting.   Endocrine: Negative for polydipsia, polyphagia and polyuria.   Genitourinary: Positive for urgency. Negative for difficulty urinating, dysuria, flank pain, frequency and hematuria.   Musculoskeletal: Positive for myalgias. Negative for arthralgias, back pain, joint swelling and neck stiffness.   Skin: Negative for pallor and rash.   Allergic/Immunologic: Negative for immunocompromised state.   Neurological: Negative for dizziness, tremors, syncope, weakness, light-headedness and headaches.   Psychiatric/Behavioral: Negative for agitation, confusion and suicidal ideas.   All other systems reviewed and are negative.    Objective:     Vital Signs (Most Recent):  Temp: 98.7 °F (37.1 °C) (11/06/18 1636)  Pulse: 93 (11/06/18 1636)  Resp: 18 (11/06/18 1636)  BP: (!) 165/76 (11/06/18 1636)  SpO2: 97 % (11/06/18 1636) Vital Signs (24h Range):  Temp:  [97.7 °F (36.5 °C)-101.5 °F (38.6 °C)] 98.7 °F (37.1 °C)  Pulse:  [] 93  Resp:  [14-26] 18  SpO2:  [94 %-97 %] 97 %  BP: (131-209)/() 165/76     Weight: 97.8 kg (215 lb 9.6 oz)  Body mass index is 35.88 kg/m².    Intake/Output Summary (Last  24 hours) at 11/6/2018 1721  Last data filed at 11/6/2018 0500  Gross per 24 hour   Intake 150 ml   Output --   Net 150 ml      Physical Exam   Constitutional: She is oriented to person, place, and time. She appears well-developed and well-nourished. No distress.   Elderly lady, severely obese, AAOx3, looks tired but NAD   HENT:   Head: Normocephalic and atraumatic.   Mouth/Throat: Oropharynx is clear and moist.   Eyes: Conjunctivae and EOM are normal. Pupils are equal, round, and reactive to light. No scleral icterus.   Neck: Normal range of motion. Neck supple. No JVD present. No thyromegaly present.   Cardiovascular: Regular rhythm and intact distal pulses. Exam reveals no gallop and no friction rub.   No murmur heard.  tachycardia   Pulmonary/Chest: Effort normal and breath sounds normal. No respiratory distress. She has no wheezes. She has no rales. She exhibits no tenderness.   Abdominal: Soft. Bowel sounds are normal. She exhibits no distension and no mass. There is no tenderness. There is no guarding.   Musculoskeletal: Normal range of motion. She exhibits no tenderness.   Neurological: She is alert and oriented to person, place, and time. No cranial nerve deficit.   Skin: Skin is warm and dry. Capillary refill takes 2 to 3 seconds. No rash noted. She is not diaphoretic. No erythema.   Psychiatric: She has a normal mood and affect.   Nursing note and vitals reviewed.      Significant Labs:   A1C:   Recent Labs   Lab 11/06/18 0327   HGBA1C 9.9*     ABGs:   Blood Culture:   Recent Labs   Lab 11/05/18 2352 11/06/18  0030   LABBLOO No Growth to date No Growth to date     BMP:   Recent Labs   Lab 11/06/18 0327   *      K 4.0      CO2 24   BUN 23   CREATININE 1.2   CALCIUM 8.2*   MG 1.4*     CBC:   Recent Labs   Lab 11/05/18 2352 11/06/18 0327   WBC 8.79 6.46   HGB 14.9 12.7   HCT 45.0 38.6    165     CMP:   Recent Labs   Lab 11/05/18 2352 11/06/18 0327   * 136   K 4.2 4.0     106   CO2 23 24   * 294*   BUN 26* 23   CREATININE 1.6* 1.2   CALCIUM 10.0 8.2*   PROT 8.3  --    ALBUMIN 3.6 2.7*   BILITOT 0.4  --    ALKPHOS 69  --    AST 25  --    ALT 20  --    ANIONGAP 12 6*   EGFRNONAA 33* 47*     Cardiac Markers:   Coagulation:   Lactic Acid:   Recent Labs   Lab 11/05/18  2352 11/06/18  0327   LACTATE 3.0* 1.3     Lipase:   Magnesium:   Recent Labs   Lab 11/05/18  2352 11/06/18  0327   MG 1.8 1.4*     Respiratory Culture:   Troponin:   TSH:   Urine Culture:   All pertinent labs within the past 24 hours have been reviewed.    Significant Imaging: I have reviewed all pertinent imaging results/findings within the past 24 hours.

## 2018-11-06 NOTE — ASSESSMENT & PLAN NOTE
- prerenal due to hyperglycemia, sepsis, IVVD  - hold ACEi  - continue IVF  - monitor I/O    Hold HCTZ too  Continue IVF

## 2018-11-06 NOTE — ASSESSMENT & PLAN NOTE
- Hold home meds  - start diabetic diet, poc glucose qac/qhs, ssi prn    Need to increase home insulin dose a little

## 2018-11-06 NOTE — PLAN OF CARE
Problem: Patient Care Overview  Goal: Plan of Care Review  Outcome: Ongoing (interventions implemented as appropriate)  No acute distress noted. IV fluids infusing. Safety measures are in place. Call bell within reach. Pain being managed. Pt free of falls. Will continue to monitor. Chart check complete.

## 2018-11-06 NOTE — ASSESSMENT & PLAN NOTE
- Severe sepsis due to UTI  - Tachycardic, tachypneic, tmax 101.5F  - initial lactate 3.0  - was given 30ml/kg fluid bolus    - continue 1/2 NS at 125cc/hr  - continue rocephin IV  - follow up cx  - trend lactate until <2.0

## 2018-11-06 NOTE — HOSPITAL COURSE
Feels better but c/o feeling chills, no fever or sweating at present, no urinary Sx. Urine Cx still NGTD, HgA1c 9.9. Mg and PO4 a little low-- replaced orally. Pt given IV tylenol and started on banana bag. Pt encouraged to ambulate. Pt given IV Rocephin. Blood cultures returned NGTD, pt's labs and vital signs returned to baseline. Pt verbalized feeling better and desire for discharge. She was seen and examined and determined to be safe and stable for discharge. Pt was advised regarding Levaquin Rx and follow up with PCP.

## 2018-11-07 VITALS
RESPIRATION RATE: 18 BRPM | HEART RATE: 88 BPM | OXYGEN SATURATION: 93 % | BODY MASS INDEX: 35.93 KG/M2 | SYSTOLIC BLOOD PRESSURE: 179 MMHG | WEIGHT: 215.63 LBS | DIASTOLIC BLOOD PRESSURE: 84 MMHG | HEIGHT: 65 IN | TEMPERATURE: 98 F

## 2018-11-07 PROBLEM — A41.9 SEPSIS DUE TO URINARY TRACT INFECTION: Status: RESOLVED | Noted: 2018-11-06 | Resolved: 2018-11-07

## 2018-11-07 PROBLEM — N17.9 AKI (ACUTE KIDNEY INJURY): Status: RESOLVED | Noted: 2018-11-06 | Resolved: 2018-11-07

## 2018-11-07 PROBLEM — N39.0 SEPSIS DUE TO URINARY TRACT INFECTION: Status: RESOLVED | Noted: 2018-11-06 | Resolved: 2018-11-07

## 2018-11-07 LAB
ALBUMIN SERPL BCP-MCNC: 2.6 G/DL
ALP SERPL-CCNC: 51 U/L
ALT SERPL W/O P-5'-P-CCNC: 18 U/L
ANION GAP SERPL CALC-SCNC: 7 MMOL/L
AST SERPL-CCNC: 21 U/L
BASOPHILS # BLD AUTO: 0.02 K/UL
BASOPHILS NFR BLD: 0.2 %
BILIRUB SERPL-MCNC: 0.2 MG/DL
BUN SERPL-MCNC: 16 MG/DL
CALCIUM SERPL-MCNC: 8.4 MG/DL
CHLORIDE SERPL-SCNC: 106 MMOL/L
CO2 SERPL-SCNC: 23 MMOL/L
CREAT SERPL-MCNC: 1.2 MG/DL
DIFFERENTIAL METHOD: ABNORMAL
EOSINOPHIL # BLD AUTO: 0 K/UL
EOSINOPHIL NFR BLD: 0.5 %
ERYTHROCYTE [DISTWIDTH] IN BLOOD BY AUTOMATED COUNT: 13.5 %
EST. GFR  (AFRICAN AMERICAN): 54 ML/MIN/1.73 M^2
EST. GFR  (NON AFRICAN AMERICAN): 47 ML/MIN/1.73 M^2
GLUCOSE SERPL-MCNC: 234 MG/DL
HCT VFR BLD AUTO: 36.8 %
HGB BLD-MCNC: 12 G/DL
LYMPHOCYTES # BLD AUTO: 2.1 K/UL
LYMPHOCYTES NFR BLD: 25.2 %
MAGNESIUM SERPL-MCNC: 1.7 MG/DL
MCH RBC QN AUTO: 29.1 PG
MCHC RBC AUTO-ENTMCNC: 32.6 G/DL
MCV RBC AUTO: 89 FL
MONOCYTES # BLD AUTO: 1.1 K/UL
MONOCYTES NFR BLD: 12.9 %
NEUTROPHILS # BLD AUTO: 5 K/UL
NEUTROPHILS NFR BLD: 61.2 %
PHOSPHATE SERPL-MCNC: 2.9 MG/DL
PLATELET # BLD AUTO: 163 K/UL
PMV BLD AUTO: 11.2 FL
POCT GLUCOSE: 185 MG/DL (ref 70–110)
POCT GLUCOSE: 250 MG/DL (ref 70–110)
POTASSIUM SERPL-SCNC: 4 MMOL/L
PROT SERPL-MCNC: 6 G/DL
RBC # BLD AUTO: 4.13 M/UL
SODIUM SERPL-SCNC: 136 MMOL/L
WBC # BLD AUTO: 8.13 K/UL

## 2018-11-07 PROCEDURE — 63600175 PHARM REV CODE 636 W HCPCS: Performed by: EMERGENCY MEDICINE

## 2018-11-07 PROCEDURE — 84100 ASSAY OF PHOSPHORUS: CPT

## 2018-11-07 PROCEDURE — G0378 HOSPITAL OBSERVATION PER HR: HCPCS

## 2018-11-07 PROCEDURE — 80053 COMPREHEN METABOLIC PANEL: CPT

## 2018-11-07 PROCEDURE — 63600175 PHARM REV CODE 636 W HCPCS: Performed by: HOSPITALIST

## 2018-11-07 PROCEDURE — 25000003 PHARM REV CODE 250: Performed by: EMERGENCY MEDICINE

## 2018-11-07 PROCEDURE — 85025 COMPLETE CBC W/AUTO DIFF WBC: CPT

## 2018-11-07 PROCEDURE — 83735 ASSAY OF MAGNESIUM: CPT

## 2018-11-07 PROCEDURE — 94799 UNLISTED PULMONARY SVC/PX: CPT

## 2018-11-07 PROCEDURE — 36415 COLL VENOUS BLD VENIPUNCTURE: CPT

## 2018-11-07 PROCEDURE — 25000003 PHARM REV CODE 250: Performed by: HOSPITALIST

## 2018-11-07 RX ORDER — LEVOFLOXACIN 750 MG/1
750 TABLET ORAL DAILY
Qty: 7 TABLET | Refills: 0 | Status: SHIPPED | OUTPATIENT
Start: 2018-11-07 | End: 2018-11-14

## 2018-11-07 RX ADMIN — MAGNESIUM OXIDE TAB 400 MG (241.3 MG ELEMENTAL MG) 400 MG: 400 (241.3 MG) TAB at 08:11

## 2018-11-07 RX ADMIN — INSULIN ASPART 2 UNITS: 100 INJECTION, SOLUTION INTRAVENOUS; SUBCUTANEOUS at 05:11

## 2018-11-07 RX ADMIN — HEPARIN SODIUM 5000 UNITS: 5000 INJECTION, SOLUTION INTRAVENOUS; SUBCUTANEOUS at 05:11

## 2018-11-07 RX ADMIN — POTASSIUM & SODIUM PHOSPHATES POWDER PACK 280-160-250 MG 2 PACKET: 280-160-250 PACK at 08:11

## 2018-11-07 RX ADMIN — INSULIN DETEMIR 10 UNITS: 100 INJECTION, SOLUTION SUBCUTANEOUS at 08:11

## 2018-11-07 RX ADMIN — INSULIN ASPART 4 UNITS: 100 INJECTION, SOLUTION INTRAVENOUS; SUBCUTANEOUS at 11:11

## 2018-11-07 RX ADMIN — POTASSIUM & SODIUM PHOSPHATES POWDER PACK 280-160-250 MG 2 PACKET: 280-160-250 PACK at 11:11

## 2018-11-07 RX ADMIN — VALSARTAN 320 MG: 80 TABLET, FILM COATED ORAL at 08:11

## 2018-11-07 RX ADMIN — ACETAMINOPHEN 1000 MG: 10 INJECTION, SOLUTION INTRAVENOUS at 05:11

## 2018-11-07 RX ADMIN — ACETAMINOPHEN 1000 MG: 10 INJECTION, SOLUTION INTRAVENOUS at 12:11

## 2018-11-07 RX ADMIN — GABAPENTIN 300 MG: 300 CAPSULE ORAL at 08:11

## 2018-11-07 NOTE — PLAN OF CARE
Problem: Patient Care Overview  Goal: Plan of Care Review  Outcome: Ongoing (interventions implemented as appropriate)  Fall precautions maintained. Pt free from falls/injuries.  Patient denies any complaints of pain at this time.   Antibiotics given as prescribed.  Ambulates and repositions independently.   Accucheck done, coverage given as needed.  Plan of care and medications discussed with patient.  Patient verbalized understanding.  Bed locked and low, call bell within reach.  Chart check done. Will continue to monitor.

## 2018-11-07 NOTE — NURSING
Patient c/o shaking chills and generalized body pain 10/10.  Temp 99.7 oral. Notified Madhuri Mckeon NP and advised to give the Ofirmev that was held at 2200 due to APAP would be over the maximum limit with this dose. D/c'd oral APAP.

## 2018-11-07 NOTE — NURSING
Patient received written and verbal discharge orders. Patient verbalized understanding. Discharging home.

## 2018-11-07 NOTE — DISCHARGE SUMMARY
[facility] Hospital Medicine  Discharge Summary      Patient Name: Radha Martinez  MRN: [id number]  Admission Date: 11/5/2018  Hospital Length of Stay: 1 days  Discharge Date and Time:  11/07/2018 2:04 PM  Attending Physician: Chano Cordoba MD  Discharging Provider: Kaila Gupta NP  Primary Care Provider: Primary Doctor No      HPI:   68F h/o IDDM and HTN presents with fever x 3 days. Associated sxs include chills, generalized myalgia, increased urinary frequency, dysuria, and cloudy urine. Symptoms are constant and moderate in severity. Exacerbated by nothing and relieved by nothing. Patient denies any fatigue, diaphoresis, CP, SOB, flank pain,hematuria, abd pain, N/V/D, HA, weakness, and all other sxs at this time. Pt denies relief from ibuprofen. No further complaints or concerns at this time.  In ER, Tmax 101.5F, tachycardic and tachypneic, UA show many bacteria, 10 wbc, and trace leukocyte esterase. Lactate 3.0. Patient was given 30ml/kg of NS and pancultured.  Rocephin was started in ER.  Hospital medicine called for admission.        * No surgery found *      Hospital Course:   Feels better but c/o feeling chills, no fever or sweating at present, no urinary Sx. Urine Cx still NGTD, HgA1c 9.9. Mg and PO4 a little low-- replaced orally. Pt given IV tylenol and started on banana bag. Pt encouraged to ambulate. Pt given IV Rocephin. Blood cultures returned NGTD, pt's labs and vital signs returned to baseline. Pt verbalized feeling better and desire for discharge. She was seen and examined and determined to be safe and stable for discharge. Pt was advised regarding Levaquin Rx and follow up with PCP.      Consults:     No new Assessment & Plan notes have been filed under this hospital service since the last note was generated.  Service: Hospital Medicine    Final Active Diagnoses:    Diagnosis Date Noted POA    Insulin dependent diabetes mellitus- Uncontrolled [E11.9, Z79.4] 11/06/2018 Not  Applicable    Essential hypertension [I10] 11/06/2018 Yes      Problems Resolved During this Admission:    Diagnosis Date Noted Date Resolved POA    PRINCIPAL PROBLEM:  Sepsis due to urinary tract infection [A41.9, N39.0] 11/06/2018 11/07/2018 Yes    SOULEYMANE (acute kidney injury) [N17.9] 11/06/2018 11/07/2018 Yes       Discharged Condition: stable    Disposition: Home or Self Care    Follow Up:  Follow-up Information     Primary Care Doctor In 3 days.    Why:  Hospital Follow Up - Fever and UTI               Patient Instructions:   No discharge procedures on file.    Significant Diagnostic Studies: Labs:   CMP   Recent Labs   Lab 11/05/18 2352 11/06/18 0327 11/07/18  0435   * 136 136   K 4.2 4.0 4.0    106 106   CO2 23 24 23   * 294* 234*   BUN 26* 23 16   CREATININE 1.6* 1.2 1.2   CALCIUM 10.0 8.2* 8.4*   PROT 8.3  --  6.0   ALBUMIN 3.6 2.7* 2.6*   BILITOT 0.4  --  0.2   ALKPHOS 69  --  51*   AST 25  --  21   ALT 20  --  18   ANIONGAP 12 6* 7*   ESTGFRAFRICA 38* 54* 54*   EGFRNONAA 33* 47* 47*    and CBC   Recent Labs   Lab 11/05/18 2352 11/06/18 0327 11/07/18  0435   WBC 8.79 6.46 8.13   HGB 14.9 12.7 12.0   HCT 45.0 38.6 36.8*    165 163       Pending Diagnostic Studies:     Procedure Component Value Units Date/Time    Urinalysis, Reflex to Urine Culture Urine, Clean Catch [[id number]] Collected:  11/05/18 2335    Order Status:  Sent Lab Status:  In process Updated:  11/05/18 2336    Specimen:  Urine          Medications:  Reconciled Home Medications:      Medication List      START taking these medications    levoFLOXacin 750 MG tablet  Commonly known as:  LEVAQUIN  Take 1 tablet (750 mg total) by mouth once daily. for 7 days        CONTINUE taking these medications    gabapentin 300 MG capsule  Commonly known as:  NEURONTIN  Take 300 mg by mouth 3 (three) times daily.     LEVEMIR FLEXPEN SUBQ  Inject into the skin.     NovoLIN R Regular U-100 Insuln 100 unit/mL injection  Generic  drug:  insulin regular     valsartan-hydrochlorothiazide 320-25 mg per tablet  Commonly known as:  DIOVAN-HCT     VICTOZA 2-SINDY 0.6 mg/0.1 mL (18 mg/3 mL) Pnij  Generic drug:  liraglutide 0.6 mg/0.1 mL (18 mg/3 mL) subq PNIJ  Inject 0.6 mg into the skin once daily.        STOP taking these medications    clindamycin 75 MG capsule  Commonly known as:  CLEOCIN     hydrocodone-acetaminophen   mg per tablet  Commonly known as:  LORTAB     insulin NPH-insulin regular (70/30) 100 unit/mL (70-30) injection  Commonly known as:  NOVOLIN 70/30     minocycline 50 MG Cap  Commonly known as:  MINOCIN,DYNACIN     oxyCODONE-acetaminophen 5-325 mg per tablet  Commonly known as:  PERCOCET            Indwelling Lines/Drains at time of discharge:   Lines/Drains/Airways          None          Time spent on the discharge of patient: > 30 minutes  Patient was seen and examined on the date of discharge and determined to be suitable for discharge.         Kaila Gupta NP  Department of Hospital Medicine  [facility]

## 2018-11-07 NOTE — PLAN OF CARE
Problem: Patient Care Overview  Goal: Plan of Care Review  Outcome: Ongoing (interventions implemented as appropriate)  Patient AAO and VSS. Temp 99.7 with shaking chills around midnight.  covered with insulin aspart per s/s.   IVF administered as ordered. Pain adequately managed with scheduled Ofirmev.  Remains free of injury. Fall precautions maintained with bed low and wheels locked. Chart review for 24 hours completed. Will continue to monitor till discharge.

## 2018-11-08 NOTE — PLAN OF CARE
11/08/18 1030   Final Note   Assessment Type Final Discharge Note   Anticipated Discharge Disposition Home   Right Care Referral Info   Post Acute Recommendation No Care

## 2018-11-11 LAB
BACTERIA BLD CULT: NORMAL
BACTERIA BLD CULT: NORMAL